# Patient Record
Sex: FEMALE | Race: WHITE | NOT HISPANIC OR LATINO | Employment: FULL TIME | URBAN - METROPOLITAN AREA
[De-identification: names, ages, dates, MRNs, and addresses within clinical notes are randomized per-mention and may not be internally consistent; named-entity substitution may affect disease eponyms.]

---

## 2017-03-23 ENCOUNTER — ALLSCRIPTS OFFICE VISIT (OUTPATIENT)
Dept: OTHER | Facility: OTHER | Age: 32
End: 2017-03-23

## 2017-03-23 DIAGNOSIS — R63.5 ABNORMAL WEIGHT GAIN: ICD-10-CM

## 2017-03-23 DIAGNOSIS — Z00.00 ENCOUNTER FOR GENERAL ADULT MEDICAL EXAMINATION WITHOUT ABNORMAL FINDINGS: ICD-10-CM

## 2018-01-13 NOTE — PROGRESS NOTES
Assessment    1  Encounter for preventive health examination (V70 0) (Z00 00)   2  Dry eye syndrome (375 15) (H04 129)   3  Recent weight gain (783 1) (R63 5)    Plan   Dry eye syndrome    · Refresh Tears 0 5 % Ophthalmic Solution; INSTILL 1 DROP IN THE AFFECTED  EYE(S) EVERY 2 HOURS WHILE AWAKE  Health Maintenance    · Eat foods that are high in calcium ; Status:Complete;   Done: [id number]   · There are ways to decrease your stress and improve your sense of well-being  We  encourage you to keep active and exercise regularly  Make time to take care of yourself  and participate in activities that you enjoy  Stay connected to friends and family that can  support and comfort you  If at any time you have thoughts of harming yourself or  someone else, contact us immediately ; Status:Active; Requested for:23Mar2017;    · We encourage all of our patients to exercise regularly  30 minutes of exercise or physical  activity five or more days a week is recommended for children and adults ;  Status:Complete;   Done: [id number]  Health Maintenance, Recent weight gain    · (1) CBC/PLT/DIFF; Status:Active; Requested for:23Mar2017;    · (1) COMPREHENSIVE METABOLIC PANEL; Status:Active; Requested for:23Mar2017;    · (1) TSH; Status:Active; Requested for:23Mar2017; Unlinked    · Prenatal Vitamins TABS    Follow-up visit in 1 year Evaluation and Treatment  Follow-up  Status: Hold For - Scheduling  Requested for: [id number]  Ordered; For: Health Maintenance;  Ordered By: Tiffanie Robles  Performed:   Due: [id number]     Discussion/Summary  healthy adult female Currently, she eats a healthy diet  cervical cancer screening is current Pt will provide with the results  Breast cancer screening: breast cancer screening is not indicated  Colorectal cancer screening: colorectal cancer screening is not indicated   Advice and education were given regarding nutrition, aerobic exercise, weight bearing exercise, calcium supplements, vitamin D supplements, reproductive health and contraception  Possible side effects of new medications were reviewed with the patient/guardian today  The treatment plan was reviewed with the patient/guardian  The patient/guardian understands and agrees with the treatment plan      Chief Complaint  New patient preventative      History of Present Illness  HM, Adult Female: The patient is being seen for a health maintenance evaluation  The last health maintenance visit was 3 year(s) ago  Social History: Household members include spouse  She is   Work status: occupation: Desk work  The patient has never smoked cigarettes  She reports occasional alcohol use  General Health: The patient's health since the last visit is described as good  She has regular dental visits  She denies vision problems  She denies hearing loss  Lifestyle:  She consumes a diverse and healthy diet  She has weight concerns  She does not exercise regularly  She does not use tobacco  She denies alcohol use  She denies drug use  Reproductive health:  she reports normal menses  Menstrual history: LMP: the last menstrual period was 3/19/17 and it was of a normal amount and duration  she uses no contraception  she is sexually active  pregnancy history: G 1P 1  Screening: cancer screening reviewed and current  Cervical cancer screening includes a pap smear performed 6 mo ago  Breast cancer screening includes no previous mammogram  She hasn't been previously screened for colorectal cancer  HPI: Patient is here to establish care  Patient states that she feels healthy  She has been unable to lose weight since her last pregnancy, which was almost 3 years ago  According to patient She eats healthy  She's not able to exercise though on a daily basis  Pt says that she has been trying to lose weight for a few months, by changing her diet  Says she's he gets frustrated within 2-3 months   If she has not lost any weight and then does binge eating  Dry Eye:   Heriberto Cannon presents with complaints of sudden onset of intermittent episodes of moderate bilateral dry eye  Episodes started 1 year ago  Her symptoms are caused by no known event  Symptoms are unchanged  Associated symptoms include eye itching and eye burning, but no eye pain, no contact lens intolerance, no blurry vision, no eye discharge, no photophobia, no blinking, no lid closure problems, no lid swelling, no dry mouth, no myalgias and no arthralgias  Review of Systems    Constitutional: as noted in HPI  Eyes: as noted in HPI    ENT: no complaints of earache, no loss of hearing, no nose bleeds, no nasal discharge, no sore throat, no hoarseness  Cardiovascular: No complaints of slow heart rate, no fast heart rate, no chest pain, no palpitations, no leg claudication, no lower extremity edema  Respiratory: No complaints of shortness of breath, no wheezing, no cough, no SOB on exertion, no orthopnea, no PND  Gastrointestinal: No complaints of abdominal pain, no constipation, no nausea or vomiting, no diarrhea, no bloody stools  Genitourinary: No complaints of dysuria, no incontinence, no pelvic pain, no dysmenorrhea, no vaginal discharge or bleeding  Musculoskeletal: as noted in HPI  Integumentary: No complaints of skin rash or lesions, no itching, no skin wounds, no breast pain or lump  Neurological: No complaints of headache, no confusion, no convulsions, no numbness, no dizziness or fainting, no tingling, no limb weakness, no difficulty walking  Psychiatric: Not suicidal, no sleep disturbance, no anxiety or depression, no change in personality, no emotional problems  Endocrine: No complaints of proptosis, no hot flashes, no muscle weakness, no deepening of the voice, no feelings of weakness  Hematologic/Lymphatic: No complaints of swollen glands, no swollen glands in the neck, does not bleed easily, does not bruise easily  Active Problems    1   Encounter for fetal anatomic survey (V28 81) (Z36)   2  Obesity (278 00) (E66 9)   3  Obesity complicating pregnancy, childbirth, or puerperium, antepartum (649 13,278 00)   (O99 210,E66 9)   4  Screening, , for risk of pre-term labor (V28 82) (Z41)    Family History  Mother    · No pertinent family history    Social History    · Never A Smoker    Current Meds   1  Prenatal Vitamins TABS; Therapy: (Recorded:[id number]) to Recorded    Allergies    1  No Known Drug Allergies    2  No Known Environmental Allergies   3  No Known Food Allergies    Vitals   Recorded: [id number] 10:44AM   Heart Rate 68   Respiration 14   Systolic 015   Diastolic 60   Height 5 ft 1 in   Weight 152 lb    BMI Calculated 28 72   BSA Calculated 1 68     Results/Data  PHQ-2 Adult Depression Screening 2017 10:46AM User, Ahs     Test Name Result Flag Reference   PHQ-2 Adult Depression Score 0     Over the last two weeks, how often have you been bothered by any of the following problems?   Little interest or pleasure in doing things: Not at all - 0  Feeling down, depressed, or hopeless: Not at all - 0   PHQ-2 Adult Depression Screening Negative         Procedure    Procedure:   Results: 20/20 in the right eye without corrective device, 20/20 in the left eye without corrective device      Signatures   Electronically signed by : Jayson Cockayne, MD; Mar 23 2017  2:59PM EST                       (Author)

## 2018-01-15 VITALS
BODY MASS INDEX: 28.7 KG/M2 | HEIGHT: 61 IN | HEART RATE: 68 BPM | SYSTOLIC BLOOD PRESSURE: 110 MMHG | WEIGHT: 152 LBS | DIASTOLIC BLOOD PRESSURE: 60 MMHG | RESPIRATION RATE: 14 BRPM

## 2018-06-25 RX ORDER — CARBOXYMETHYLCELLULOSE SODIUM 5 MG/ML
SOLUTION/ DROPS OPHTHALMIC
COMMUNITY
Start: 2017-03-23